# Patient Record
Sex: MALE | URBAN - METROPOLITAN AREA
[De-identification: names, ages, dates, MRNs, and addresses within clinical notes are randomized per-mention and may not be internally consistent; named-entity substitution may affect disease eponyms.]

---

## 2023-12-20 ENCOUNTER — TELEPHONE ENCOUNTER (OUTPATIENT)
Dept: URBAN - METROPOLITAN AREA CLINIC 35 | Facility: CLINIC | Age: 53
End: 2023-12-20

## 2023-12-21 ENCOUNTER — LAB OUTSIDE AN ENCOUNTER (OUTPATIENT)
Dept: URBAN - METROPOLITAN AREA CLINIC 37 | Facility: CLINIC | Age: 53
End: 2023-12-21

## 2023-12-21 ENCOUNTER — OFFICE VISIT (OUTPATIENT)
Dept: URBAN - METROPOLITAN AREA CLINIC 37 | Facility: CLINIC | Age: 53
End: 2023-12-21
Payer: COMMERCIAL

## 2023-12-21 VITALS
BODY MASS INDEX: 21.34 KG/M2 | WEIGHT: 113 LBS | DIASTOLIC BLOOD PRESSURE: 70 MMHG | HEIGHT: 61 IN | SYSTOLIC BLOOD PRESSURE: 102 MMHG

## 2023-12-21 DIAGNOSIS — R16.0 LIVER MASSES: ICD-10-CM

## 2023-12-21 DIAGNOSIS — K62.5 RECTAL BLEEDING: ICD-10-CM

## 2023-12-21 DIAGNOSIS — R93.5 ABNORMAL ABDOMINAL CT SCAN: ICD-10-CM

## 2023-12-21 DIAGNOSIS — D50.0 IRON DEFICIENCY ANEMIA DUE TO CHRONIC BLOOD LOSS: ICD-10-CM

## 2023-12-21 PROBLEM — 724556004: Status: ACTIVE | Noted: 2023-12-21

## 2023-12-21 PROBLEM — 12063002: Status: ACTIVE | Noted: 2023-12-21

## 2023-12-21 PROCEDURE — 99204 OFFICE O/P NEW MOD 45 MIN: CPT | Performed by: NURSE PRACTITIONER

## 2023-12-21 RX ORDER — POLYETHYLENE GLYCOL 3350, SODIUM SULFATE, SODIUM CHLORIDE, POTASSIUM CHLORIDE, ASCORBIC ACID, SODIUM ASCORBATE 140-9-5.2G
AS DIRECTED KIT ORAL ONCE
Qty: 1 | Refills: 0 | OUTPATIENT
Start: 2023-12-21 | End: 2023-12-23

## 2023-12-21 RX ORDER — OMEPRAZOLE 20 MG/1
1 CAPSULE 30 MINUTES BEFORE MORNING MEAL CAPSULE, DELAYED RELEASE ORAL ONCE A DAY
Status: ACTIVE | COMMUNITY

## 2023-12-21 NOTE — PHYSICAL EXAM CONSTITUTIONAL:
pale, thin, alert but with  ill-appearing, in no acute distress , ambulating without difficulty , normal communication ability

## 2023-12-21 NOTE — HPI-INITIAL SCREENING COLONOSCOPY
Currently taking anticoagulants/NSAIDs: currently taking Oxycodone prn, not on any other blood thinner Denies dysphagia or odynophagia

## 2023-12-26 LAB
ABSOLUTE BASOPHILS: 83
ABSOLUTE EOSINOPHILS: 83
ABSOLUTE LYMPHOCYTES: 2048
ABSOLUTE MONOCYTES: 870
ABSOLUTE NEUTROPHILS: 4418
AFP, SERUM: 1.1
AFP-L3%, SERUM: (no result)
BASOPHILS: 1.1
CBC MORPHOLOGY: (no result)
CEA: 68.7
EOSINOPHILS: 1.1
HEMATOCRIT: 36.5
HEMOGLOBIN: 11.5
LYMPHOCYTES: 27.3
MCH: 21.5
MCHC: 31.5
MCV: 68.2
MONOCYTES: 11.6
MPV: 11.2
NEUTROPHILS: 58.9
PLATELET COUNT: 339
RDW: 15.1
RED BLOOD CELL COUNT: 5.35
WHITE BLOOD CELL COUNT: 7.5

## 2023-12-27 ENCOUNTER — CLAIMS CREATED FROM THE CLAIM WINDOW (OUTPATIENT)
Dept: URBAN - METROPOLITAN AREA SURGERY CENTER 8 | Facility: SURGERY CENTER | Age: 53
End: 2023-12-27
Payer: COMMERCIAL

## 2023-12-27 ENCOUNTER — CLAIMS CREATED FROM THE CLAIM WINDOW (OUTPATIENT)
Dept: URBAN - METROPOLITAN AREA CLINIC 4 | Facility: CLINIC | Age: 53
End: 2023-12-27
Payer: COMMERCIAL

## 2023-12-27 DIAGNOSIS — K63.89 APPENDICITIS EPIPLOICA: ICD-10-CM

## 2023-12-27 DIAGNOSIS — K92.1 RECTAL BLEEDING: ICD-10-CM

## 2023-12-27 DIAGNOSIS — K63.5 BENIGN COLON POLYPS: ICD-10-CM

## 2023-12-27 DIAGNOSIS — R93.3 ABNORMAL CT SCAN, GASTROINTESTINAL TRACT: ICD-10-CM

## 2023-12-27 DIAGNOSIS — D12.4 ADENOMA OF DESCENDING COLON: ICD-10-CM

## 2023-12-27 DIAGNOSIS — C18.7 ADENOCARCINOMA OF SIGMOID COLON: ICD-10-CM

## 2023-12-27 DIAGNOSIS — D12.2 BENIGN NEOPLASM OF ASCENDING COLON: ICD-10-CM

## 2023-12-27 DIAGNOSIS — C18.9 ADENOCARCINOMA OF COLON: ICD-10-CM

## 2023-12-27 DIAGNOSIS — D12.2 ADENOMA OF ASCENDING COLON: ICD-10-CM

## 2023-12-27 PROCEDURE — 00811 ANES LWR INTST NDSC NOS: CPT | Performed by: NURSE ANESTHETIST, CERTIFIED REGISTERED

## 2023-12-27 PROCEDURE — 45385 COLONOSCOPY W/LESION REMOVAL: CPT | Performed by: INTERNAL MEDICINE

## 2023-12-27 PROCEDURE — 88342 IMHCHEM/IMCYTCHM 1ST ANTB: CPT | Performed by: PATHOLOGY

## 2023-12-27 PROCEDURE — 45381 COLONOSCOPY SUBMUCOUS NJX: CPT | Performed by: INTERNAL MEDICINE

## 2023-12-27 PROCEDURE — 45384 COLONOSCOPY W/LESION REMOVAL: CPT | Performed by: INTERNAL MEDICINE

## 2023-12-27 PROCEDURE — 88305 TISSUE EXAM BY PATHOLOGIST: CPT | Performed by: PATHOLOGY

## 2023-12-27 PROCEDURE — 45390 COLONOSCOPY W/RESECTION: CPT | Performed by: INTERNAL MEDICINE

## 2023-12-27 PROCEDURE — 88341 IMHCHEM/IMCYTCHM EA ADD ANTB: CPT | Performed by: PATHOLOGY

## 2023-12-27 PROCEDURE — G8907 PT DOC NO EVENTS ON DISCHARG: HCPCS | Performed by: INTERNAL MEDICINE

## 2023-12-27 RX ORDER — OMEPRAZOLE 20 MG/1
1 CAPSULE 30 MINUTES BEFORE MORNING MEAL CAPSULE, DELAYED RELEASE ORAL ONCE A DAY
Status: ACTIVE | COMMUNITY

## 2023-12-28 ENCOUNTER — LAB OUTSIDE AN ENCOUNTER (OUTPATIENT)
Dept: URBAN - METROPOLITAN AREA CLINIC 37 | Facility: CLINIC | Age: 53
End: 2023-12-28

## 2023-12-29 ENCOUNTER — OFFICE VISIT (OUTPATIENT)
Dept: URBAN - METROPOLITAN AREA CLINIC 124 | Facility: CLINIC | Age: 53
End: 2023-12-29

## 2024-01-09 ENCOUNTER — TELEPHONE ENCOUNTER (OUTPATIENT)
Dept: URBAN - METROPOLITAN AREA CLINIC 37 | Facility: CLINIC | Age: 54
End: 2024-01-09

## 2024-01-11 ENCOUNTER — TELEPHONE ENCOUNTER (OUTPATIENT)
Dept: URBAN - METROPOLITAN AREA CLINIC 63 | Facility: CLINIC | Age: 54
End: 2024-01-11

## 2024-01-22 PROBLEM — 428283002: Status: ACTIVE | Noted: 2024-01-22

## 2024-01-25 ENCOUNTER — DASHBOARD ENCOUNTERS (OUTPATIENT)
Age: 54
End: 2024-01-25

## 2024-01-25 ENCOUNTER — OFFICE VISIT (OUTPATIENT)
Dept: URBAN - METROPOLITAN AREA CLINIC 37 | Facility: CLINIC | Age: 54
End: 2024-01-25
Payer: COMMERCIAL

## 2024-01-25 VITALS
OXYGEN SATURATION: 97 % | DIASTOLIC BLOOD PRESSURE: 74 MMHG | SYSTOLIC BLOOD PRESSURE: 90 MMHG | HEART RATE: 78 BPM | WEIGHT: 100 LBS | BODY MASS INDEX: 18.88 KG/M2 | HEIGHT: 61 IN

## 2024-01-25 DIAGNOSIS — C18.7 ADENOCARCINOMA OF SIGMOID COLON: ICD-10-CM

## 2024-01-25 DIAGNOSIS — K59.09 CHRONIC CONSTIPATION: ICD-10-CM

## 2024-01-25 DIAGNOSIS — Z86.010 PERSONAL HISTORY OF COLONIC POLYPS: ICD-10-CM

## 2024-01-25 DIAGNOSIS — R11.0 NAUSEA: ICD-10-CM

## 2024-01-25 PROBLEM — 236069009: Status: ACTIVE | Noted: 2024-01-25

## 2024-01-25 PROBLEM — 422587007: Status: ACTIVE | Noted: 2024-01-25

## 2024-01-25 PROBLEM — 301756000: Status: ACTIVE | Noted: 2024-01-22

## 2024-01-25 PROCEDURE — 99214 OFFICE O/P EST MOD 30 MIN: CPT | Performed by: INTERNAL MEDICINE

## 2024-01-25 RX ORDER — PROCHLORPERAZINE MALEATE 10 MG/1
1 TABLET AS NEEDED TABLET ORAL THREE TIMES A DAY
Status: ACTIVE | COMMUNITY
Start: 2024-01-25

## 2024-01-25 RX ORDER — OMEPRAZOLE 20 MG/1
1 CAPSULE 30 MINUTES BEFORE MORNING MEAL CAPSULE, DELAYED RELEASE ORAL ONCE A DAY
Status: ON HOLD | COMMUNITY

## 2024-01-25 RX ORDER — POLYETHYLENE GLYCOL 3350 17 G/17G
1 SCOOP MIXED WITH 8 OUNCES OF FLUID POWDER, FOR SOLUTION ORAL ONCE A DAY
Qty: 510 GRAM | Refills: 5 | OUTPATIENT
Start: 2024-01-25 | End: 2024-07-23

## 2024-01-25 RX ORDER — ONDANSETRON HYDROCHLORIDE 8 MG/1
1 TABLET AS NEEDED TABLET, FILM COATED ORAL EVERY 8 HOURS
Status: ACTIVE | COMMUNITY
Start: 2024-01-25

## 2024-01-25 RX ORDER — PANTOPRAZOLE SODIUM 20 MG/1
1 TABLET 30 MINUTES BEFORE BREAKFAST TABLET, DELAYED RELEASE ORAL ONCE A DAY
Qty: 90 | Refills: 2 | OUTPATIENT
Start: 2024-01-25

## 2024-07-25 ENCOUNTER — OFFICE VISIT (OUTPATIENT)
Dept: URBAN - METROPOLITAN AREA CLINIC 37 | Facility: CLINIC | Age: 54
End: 2024-07-25